# Patient Record
(demographics unavailable — no encounter records)

---

## 2025-02-20 NOTE — HISTORY OF PRESENT ILLNESS
[7] : 7 [4] : 4 [Dull/Aching] : dull/aching [Radiating] : radiating [Sharp] : sharp [Constant] : constant [Ice] : ice [Heat] : heat [Physical therapy] : physical therapy [Standing] : standing [Full time] : Work status: full time [de-identified] : 02/17/2025: BENTLEY GRANDE, a 25 year old female presents today for back. Reports about 1 year history of left sided lower back pain. Reports history of left tibia and ankle fracture, treated with ORIF, spent some time NWB with crutches. Describes her pain as constantly present, with radiating pain to the right buttock and lateral thigh. Reports that pain is aggravated with increased activity and forward flexion. She has attended physical therapy, completed approx. 3 months which she did find to be helpful but temporary. Has been doing home exercises on her own.    [] : no [FreeTextEntry7] : zhanna short [FreeTextEntry9] : stretching, biofreeze, icy hot [de-identified] : over stretching [de-identified] : pcp, acupuncture [de-identified] : xr l-spine done at  [de-identified] : [facility]

## 2025-02-20 NOTE — DISCUSSION/SUMMARY
[de-identified] : 25f with lower back pain and radicular complaints present over the past 1 year. X-ray of the lumbar spine from 5/2024 showing mid lumbar scoliosis. loss of disc space height at L5-S1. Hx of LLE tibia /ankle fx treated with ORIF. On exam, leg length discrepancy 89cm right and 92cm left. Discussed trying a 3/8 inch insert for the right shoe. Has failed conservative treatment including physical therapy. Will obtain MRI of the lumbar spine to eval for HNP will be used to plan future treatment and possible surgical intervention. Patient will follow up after MRI.    Prior to appointment and during encounter with patient extensive medical records were reviewed including but not limited to, hospital records, out patient records, imaging results, and lab data. During this appointment the patient was examined, diagnoses were discussed and explained in a face to face manner. In addition extensive time was spent reviewing aforementioned diagnostic studies. Counseling including abnormal image results, differential diagnoses, treatment options, risk and benefits, lifestyle changes, current condition, and current medications was performed. Patient's comments, questions, and concerns were address and patient verbalized understanding. Based on this patient's presentation at our office, which is an orthopedic spine surgeon's office, this patient inherently / intrinsically has a risk, however minute, of developing issues such as Cauda equina syndrome, bowel and bladder changes, or progression of motor or neurological deficits such as paralysis which may be permanent.   JENNIFER VILLANUEVA Acting as a Scribe for Dr. Zach CASTELLANOS, Jennifer Villanueva, attest that this documentation has been prepared under the direction and in the presence of Provider Carlin Serrano MD.

## 2025-02-20 NOTE — HISTORY OF PRESENT ILLNESS
[7] : 7 [4] : 4 [Dull/Aching] : dull/aching [Radiating] : radiating [Sharp] : sharp [Constant] : constant [Ice] : ice [Heat] : heat [Physical therapy] : physical therapy [Standing] : standing [Full time] : Work status: full time [de-identified] : 02/17/2025: BENTLEY GRANDE, a 25 year old female presents today for back. Reports about 1 year history of left sided lower back pain. Reports history of left tibia and ankle fracture, treated with ORIF, spent some time NWB with crutches. Describes her pain as constantly present, with radiating pain to the right buttock and lateral thigh. Reports that pain is aggravated with increased activity and forward flexion. She has attended physical therapy, completed approx. 3 months which she did find to be helpful but temporary. Has been doing home exercises on her own.    [] : no [FreeTextEntry7] : zhanna short [FreeTextEntry9] : stretching, biofreeze, icy hot [de-identified] : over stretching [de-identified] : pcp, acupuncture [de-identified] : xr l-spine done at  [de-identified] : [facility]

## 2025-02-20 NOTE — PHYSICAL EXAM
[Flexion] : flexion [Normal Coordination] : normal coordination [Normal DTR UE/LE] : normal DTR UE/LE  [Normal Sensation] : normal sensation [Normal Mood and Affect] : normal mood and affect [Oriented] : oriented [Able to Communicate] : able to communicate [Normal Skin] : normal skin [No Rash] : no rash [No Ulcers] : no ulcers [No Lesions] : no lesions [No obvious lymphadenopathy in areas examined] : no obvious lymphadenopathy in areas examined [Well Developed] : well developed [Peripheral vascular exam is grossly normal] : peripheral vascular exam is grossly normal [No Respiratory Distress] : no respiratory distress [] : non-antalgic [FreeTextEntry8] : R>L [de-identified] : left lateral lower LE decreased light touch sensation.

## 2025-02-20 NOTE — HISTORY OF PRESENT ILLNESS
[7] : 7 [4] : 4 [Dull/Aching] : dull/aching [Radiating] : radiating [Sharp] : sharp [Constant] : constant [Ice] : ice [Heat] : heat [Physical therapy] : physical therapy [Standing] : standing [Full time] : Work status: full time [de-identified] : 02/17/2025: BENTLEY GRANDE, a 25 year old female presents today for back. Reports about 1 year history of left sided lower back pain. Reports history of left tibia and ankle fracture, treated with ORIF, spent some time NWB with crutches. Describes her pain as constantly present, with radiating pain to the right buttock and lateral thigh. Reports that pain is aggravated with increased activity and forward flexion. She has attended physical therapy, completed approx. 3 months which she did find to be helpful but temporary. Has been doing home exercises on her own.    [] : no [FreeTextEntry7] : zhanna short [FreeTextEntry9] : stretching, biofreeze, icy hot [de-identified] : over stretching [de-identified] : pcp, acupuncture [de-identified] : xr l-spine done at  [de-identified] : [facility]

## 2025-02-20 NOTE — PHYSICAL EXAM
[Flexion] : flexion [Normal Coordination] : normal coordination [Normal DTR UE/LE] : normal DTR UE/LE  [Normal Sensation] : normal sensation [Normal Mood and Affect] : normal mood and affect [Oriented] : oriented [Able to Communicate] : able to communicate [Normal Skin] : normal skin [No Rash] : no rash [No Ulcers] : no ulcers [No Lesions] : no lesions [No obvious lymphadenopathy in areas examined] : no obvious lymphadenopathy in areas examined [Well Developed] : well developed [Peripheral vascular exam is grossly normal] : peripheral vascular exam is grossly normal [No Respiratory Distress] : no respiratory distress [] : non-antalgic [FreeTextEntry8] : R>L [de-identified] : left lateral lower LE decreased light touch sensation.

## 2025-02-20 NOTE — PHYSICAL EXAM
[Flexion] : flexion [Normal Coordination] : normal coordination [Normal DTR UE/LE] : normal DTR UE/LE  [Normal Sensation] : normal sensation [Normal Mood and Affect] : normal mood and affect [Oriented] : oriented [Able to Communicate] : able to communicate [Normal Skin] : normal skin [No Rash] : no rash [No Ulcers] : no ulcers [No Lesions] : no lesions [No obvious lymphadenopathy in areas examined] : no obvious lymphadenopathy in areas examined [Well Developed] : well developed [Peripheral vascular exam is grossly normal] : peripheral vascular exam is grossly normal [No Respiratory Distress] : no respiratory distress [] : non-antalgic [FreeTextEntry8] : R>L [de-identified] : left lateral lower LE decreased light touch sensation.

## 2025-02-20 NOTE — DISCUSSION/SUMMARY
[de-identified] : 25f with lower back pain and radicular complaints present over the past 1 year. X-ray of the lumbar spine from 5/2024 showing mid lumbar scoliosis. loss of disc space height at L5-S1. Hx of LLE tibia /ankle fx treated with ORIF. On exam, leg length discrepancy 89cm right and 92cm left. Discussed trying a 3/8 inch insert for the right shoe. Has failed conservative treatment including physical therapy. Will obtain MRI of the lumbar spine to eval for HNP will be used to plan future treatment and possible surgical intervention. Patient will follow up after MRI.    Prior to appointment and during encounter with patient extensive medical records were reviewed including but not limited to, hospital records, out patient records, imaging results, and lab data. During this appointment the patient was examined, diagnoses were discussed and explained in a face to face manner. In addition extensive time was spent reviewing aforementioned diagnostic studies. Counseling including abnormal image results, differential diagnoses, treatment options, risk and benefits, lifestyle changes, current condition, and current medications was performed. Patient's comments, questions, and concerns were address and patient verbalized understanding. Based on this patient's presentation at our office, which is an orthopedic spine surgeon's office, this patient inherently / intrinsically has a risk, however minute, of developing issues such as Cauda equina syndrome, bowel and bladder changes, or progression of motor or neurological deficits such as paralysis which may be permanent.   JENNIFER VILLANUEVA Acting as a Scribe for Dr. Zach CASTELLANOS, Jennifer Villanueva, attest that this documentation has been prepared under the direction and in the presence of Provider Carlin Serrano MD.

## 2025-02-20 NOTE — DATA REVIEWED
[Outside X-rays] : outside x-rays [Lumbar Spine] : lumbar spine [Report was reviewed and noted in the chart] : The report was reviewed and noted in the chart [I independently reviewed and interpreted images and report] : I independently reviewed and interpreted images and report [I reviewed the films/CD] : I reviewed the films/CD [FreeTextEntry1] : I stop paperwork reviewed PT progress notes reviewed

## 2025-02-20 NOTE — DISCUSSION/SUMMARY
[de-identified] : 25f with lower back pain and radicular complaints present over the past 1 year. X-ray of the lumbar spine from 5/2024 showing mid lumbar scoliosis. loss of disc space height at L5-S1. Hx of LLE tibia /ankle fx treated with ORIF. On exam, leg length discrepancy 89cm right and 92cm left. Discussed trying a 3/8 inch insert for the right shoe. Has failed conservative treatment including physical therapy. Will obtain MRI of the lumbar spine to eval for HNP will be used to plan future treatment and possible surgical intervention. Patient will follow up after MRI.    Prior to appointment and during encounter with patient extensive medical records were reviewed including but not limited to, hospital records, out patient records, imaging results, and lab data. During this appointment the patient was examined, diagnoses were discussed and explained in a face to face manner. In addition extensive time was spent reviewing aforementioned diagnostic studies. Counseling including abnormal image results, differential diagnoses, treatment options, risk and benefits, lifestyle changes, current condition, and current medications was performed. Patient's comments, questions, and concerns were address and patient verbalized understanding. Based on this patient's presentation at our office, which is an orthopedic spine surgeon's office, this patient inherently / intrinsically has a risk, however minute, of developing issues such as Cauda equina syndrome, bowel and bladder changes, or progression of motor or neurological deficits such as paralysis which may be permanent.   JENNIFER VILLANUEVA Acting as a Scribe for Dr. Zach CASTELLANOS, Jennifer Villanueva, attest that this documentation has been prepared under the direction and in the presence of Provider Carlin Serrano MD.

## 2025-04-02 NOTE — PHYSICAL EXAM
[] : non-antalgic [FreeTextEntry8] : R>L [de-identified] : left lateral lower LE decreased light touch sensation.

## 2025-04-02 NOTE — DATA REVIEWED
[FreeTextEntry1] : On my interpretation of these images & reports Lumbar Spine MRI Ene 2/21/25  Central to right mild disc herniation L5S1  I stop paperwork reviewed

## 2025-04-02 NOTE — DISCUSSION/SUMMARY
[de-identified] : 25f with central to right mild disc herniation L5S1 MRI discussed & reviewed.  I advised the patient that I do not anticipate the findings on MRI to result in surgery.  I am referring the patient to pain management to discuss trying LESI for pain relief.  She will implement shoe insert given leg length discrepancy.  Continue lumbar HEP Discussed medical mgmt and activity modification F/u 6 weeks Cumulative encounter duration exceeded 30 minutes   Prior to appointment and during encounter with patient extensive medical records were reviewed including but not limited to, hospital records, out patient records, imaging results, and lab data. During this appointment the patient was examined, diagnoses were discussed and explained in a face to face manner. In addition extensive time was spent reviewing aforementioned diagnostic studies. Counseling including abnormal image results, differential diagnoses, treatment options, risk and benefits, lifestyle changes, current condition, and current medications was performed. Patient's comments, questions, and concerns were address and patient verbalized understanding. Based on this patient's presentation at our office, which is an orthopedic spine surgeon's office, this patient inherently / intrinsically has a risk, however minute, of developing issues such as Cauda equina syndrome, bowel and bladder changes, or progression of motor or neurological deficits such as paralysis which may be permanent.   LAQUITA LIVINGSTON Acting as a Scribe for Dr. Zach CASTELLANOS, Laquita Livingston, attest that this documentation has been prepared under the direction and in the presence of Provider Carlin Serrano MD.

## 2025-04-02 NOTE — HISTORY OF PRESENT ILLNESS
[de-identified] : 03/31/2025 - Patient presenting to review MRI. C/o LBP, radiating pain to the right buttock and lateral thigh. Has been doing home exercises on her own.   02/17/2025: BENTLEY GRANDE, a 25 year old female presents today for back. Reports about 1 year history of left sided lower back pain. Reports history of left tibia and ankle fracture, treated with ORIF, spent some time NWB with crutches. Describes her pain as constantly present, with radiating pain to the right buttock and lateral thigh. Reports that pain is aggravated with increased activity and forward flexion. She has attended physical therapy, completed approx. 3 months which she did find to be helpful but temporary. Has been doing home exercises on her own.      [] : no [FreeTextEntry6] : Flare ups occur now and then, pain can go up to a 6 or 7 [FreeTextEntry7] : RT glute [FreeTextEntry9] : stretching, biofreeze, icy hot [de-identified] : over stretching [de-identified] : PCP [de-identified] : xr l-spine done at  [de-identified] : [facility]

## 2025-04-02 NOTE — HISTORY OF PRESENT ILLNESS
[de-identified] : 03/31/2025 - Patient presenting to review MRI. C/o LBP, radiating pain to the right buttock and lateral thigh. Has been doing home exercises on her own.   02/17/2025: BENTLEY GRANDE, a 25 year old female presents today for back. Reports about 1 year history of left sided lower back pain. Reports history of left tibia and ankle fracture, treated with ORIF, spent some time NWB with crutches. Describes her pain as constantly present, with radiating pain to the right buttock and lateral thigh. Reports that pain is aggravated with increased activity and forward flexion. She has attended physical therapy, completed approx. 3 months which she did find to be helpful but temporary. Has been doing home exercises on her own.      [] : no [FreeTextEntry6] : Flare ups occur now and then, pain can go up to a 6 or 7 [FreeTextEntry7] : RT glute [FreeTextEntry9] : stretching, biofreeze, icy hot [de-identified] : over stretching [de-identified] : PCP [de-identified] : xr l-spine done at  [de-identified] : [facility] [employer]

## 2025-04-02 NOTE — DISCUSSION/SUMMARY
[de-identified] : 25f with central to right mild disc herniation L5S1 MRI discussed & reviewed.  I advised the patient that I do not anticipate the findings on MRI to result in surgery.  I am referring the patient to pain management to discuss trying LESI for pain relief.  She will implement shoe insert given leg length discrepancy.  Continue lumbar HEP Discussed medical mgmt and activity modification F/u 6 weeks Cumulative encounter duration exceeded 30 minutes   Prior to appointment and during encounter with patient extensive medical records were reviewed including but not limited to, hospital records, out patient records, imaging results, and lab data. During this appointment the patient was examined, diagnoses were discussed and explained in a face to face manner. In addition extensive time was spent reviewing aforementioned diagnostic studies. Counseling including abnormal image results, differential diagnoses, treatment options, risk and benefits, lifestyle changes, current condition, and current medications was performed. Patient's comments, questions, and concerns were address and patient verbalized understanding. Based on this patient's presentation at our office, which is an orthopedic spine surgeon's office, this patient inherently / intrinsically has a risk, however minute, of developing issues such as Cauda equina syndrome, bowel and bladder changes, or progression of motor or neurological deficits such as paralysis which may be permanent.   LAQUITA LIVINGSTON Acting as a Scribe for Dr. Zach CASTELLANOS, Laquita Livingston, attest that this documentation has been prepared under the direction and in the presence of Provider Carlin Serrano MD.

## 2025-04-02 NOTE — PHYSICAL EXAM
[] : non-antalgic [FreeTextEntry8] : R>L [de-identified] : left lateral lower LE decreased light touch sensation.

## 2025-04-18 NOTE — HISTORY OF PRESENT ILLNESS
[Lower back] : lower back [7] : 7 [5] : 5 [Dull/Aching] : dull/aching [Sharp] : sharp [Shooting] : shooting [Tightness] : tightness [Constant] : constant [Leisure] : leisure [Work] : work [Sleep] : sleep [Walking] : walking [Bending forward] : bending forward [Stairs] : stairs [Lying in bed] : lying in bed [FreeTextEntry1] : 25 year F presents for initial evaluation regarding their low back pain.  Pain started about 2 years ago, no inciting event, recently got worse. Saw Dr. Serrano.   Current treatment: Denies   Location: Right lower back into the right buttock, proximal posterior thigh Numbness/tingling: Left calf numbness 2/2 old trauma Weakness: Denies   Bowel/bladder dysfunction: Denies   Prior injections:   Prior Treatments: PT, NSAIDs, Tylenol   Pertinent Surgical History: Denies   Anticoagulation: Denies     Patient denies current infection, fevers, or chills. Physician Disclaimer: I have personally reviewed and confirmed all HPI data with the patient. [] : no [FreeTextEntry7] : b/k buttock mainly the right  [FreeTextEntry9] : stretching, icyhot/ biofreeze  [de-identified] : getting up from a sitting position or from laying down  [de-identified] : arnaud

## 2025-04-18 NOTE — PHYSICAL EXAM
[de-identified] : General: Appears well nourished and well developed, no acute distress Musculoskeletal: Gait is non-antalgic, patient is ambulating without assistance Lumbar Spine Exam:                       Inspection:    erythema (-)   ecchymosis (-)   rashes (-)                         Palpation: Palpation/percussion at the midline lumbar levels reveals no tenderness                      ROM:    ROM - full range of motion with mild stiffness                           Strength Testin/5 in the bilateral lower extremities                      Reflexes: 2/2 in the bilateral lower extremities                      Sensation: Sensation to light touch grossly intact in the bilateral lower extremities                      Special Tests:  SLR: R (-) ; L (-), Facet loading: R (-) ; L (-)

## 2025-04-18 NOTE — ASSESSMENT
[FreeTextEntry1] : 25 year F presents with back and leg pain.  Lumbar radiculitis, attempt TFESI.  A discussion regarding available pain management treatment options occurred with the patient.  These included interventional, rehabilitative, pharmacological, and alternative modalities. We will proceed with the following:   Interventional treatment options: - Proceed with Right L5-S1 with fluoroscopic guidance - If inadequate relief would likely consider ILESI - see additional instructions below      Rehabilitative options: - initiate trial of physical therapy - After pain control - participation in active HEP was discussed and instructions provided   Medication based treatment options: - Denies   Complementary treatment options: - Weight management and lifestyle modifications discussed    Additional treatment recommendations as follows: - patient to follow up with Dr. Serrano - [facility] after [facility]  Patient is presenting with acute/sub-acute pain with impairment in ADLs and functionality. The pain has not responded to at least 6 weeks of conservative care including NSAID therapy, OTC analgesics and/or physical therapy and/or home exercise program within the last 3 months.  The risks, benefits and alternatives of the proposed procedure were explained in detail with the patient. The risks outlined include but are not limited to infection, bleeding, post- dural puncture headache, nerve injury, a temporary increase in pain, failure to resolve symptoms, need for future interventions, allergic reaction, and possible elevation of blood sugar in diabetics if using corticosteroid.  All questions were answered to patient's apparent satisfaction, and he/she verbalized an understanding.

## 2025-04-18 NOTE — DATA REVIEWED
[FreeTextEntry1] :  MRI images personally reviewed and reviewed with patient. L/S MRI ZP 02/2025: L5-S1: Central annular fissure with disc protrusion mildly narrowing the thecal sac

## 2025-05-13 NOTE — PHYSICAL EXAM
[Normal Coordination] : normal coordination [Normal DTR UE/LE] : normal DTR UE/LE  [Normal Sensation] : normal sensation [Normal Mood and Affect] : normal mood and affect [Oriented] : oriented [Able to Communicate] : able to communicate [Normal Skin] : normal skin [No Rash] : no rash [No Ulcers] : no ulcers [No Lesions] : no lesions [No obvious lymphadenopathy in areas examined] : no obvious lymphadenopathy in areas examined [Well Developed] : well developed [Peripheral vascular exam is grossly normal] : peripheral vascular exam is grossly normal [No Respiratory Distress] : no respiratory distress [Lungs clear to auscultation bilaterally] : lungs clear to auscultation bilaterally [Normal Bowel Sounds] : normal bowel sounds [Non-Tender] : non-tender [No HSM] : no HSM [No Mass] : no mass [Flexion] : flexion [] : non-antalgic [FreeTextEntry8] : R>L

## 2025-05-13 NOTE — DATA REVIEWED
[FreeTextEntry1] : On my interpretation of these images & reports Lumbar Spine MRI Ene 2/21/25  Central to right mild disc herniation L5S1  I stop paperwork reviewed Pain mgmt progress notes reviewed

## 2025-05-13 NOTE — HISTORY OF PRESENT ILLNESS
[6] : 6 [5] : 5 [Full time] : Work status: full time [de-identified] : 05/12/2025 - Patient returns for follow up visit. C/o low back pain with intermittent radiation into the RT buttock. Pain exacerbated with extended standing and walking. Takes ibuprofen once every other week. Feels back pain trending positively. In the process of scheduling LESI with Dr. Crawford. She is currently performing home stretching exercises and plans to start PT in the future.   03/31/2025 - Patient presenting to review MRI. C/o LBP, radiating pain to the right buttock and lateral thigh. Has been doing home exercises on her own.   02/17/2025: BENTLEY GRANDE, a 25 year old female presents today for back. Reports about 1 year history of left sided lower back pain. Reports history of left tibia and ankle fracture, treated with ORIF, spent some time NWB with crutches. Describes her pain as constantly present, with radiating pain to the right buttock and lateral thigh. Reports that pain is aggravated with increased activity and forward flexion. She has attended physical therapy, completed approx. 3 months which she did find to be helpful but temporary. Has been doing home exercises on her own.      [de-identified] : pain mgmt

## 2025-05-13 NOTE — DISCUSSION/SUMMARY
[de-identified] : 25f with central to right mild disc herniation L5S1 Patient will schedule indicated LESI with Dr. Crawford. She will continue with exercise-based rehabilitation, referred for formal lumbar physical therapy to work on stretching strengthening and ROM. Continue  ibuprofen as prescribed for pain relief. F/u 6 wks  Prior to appointment and during encounter with patient extensive medical records were reviewed including but not limited to, hospital records, out patient records, imaging results, and lab data. During this appointment the patient was examined, diagnoses were discussed and explained in a face to face manner. In addition extensive time was spent reviewing aforementioned diagnostic studies. Counseling including abnormal image results, differential diagnoses, treatment options, risk and benefits, lifestyle changes, current condition, and current medications was performed. Patient's comments, questions, and concerns were address and patient verbalized understanding. Based on this patient's presentation at our office, which is an orthopedic spine surgeon's office, this patient inherently / intrinsically has a risk, however minute, of developing issues such as Cauda equina syndrome, bowel and bladder changes, or progression of motor or neurological deficits such as paralysis which may be permanent.   LAQUITA MILES Acting as a Scribe for Laquita Humphreys, attest that this documentation has been prepared under the direction and in the presence of Provider Carlin Serrano MD.

## 2025-05-29 NOTE — PROCEDURE
[FreeTextEntry3] : Date of Service: 05/29/2025     Account: [id number]    Patient: BENTLEY GRANDE     YOB: 1999    Age: 25 year     Surgeon:      Cezar Crawford DO    Assistant:    None    Pre-Operative Diagnosis:         Lumbar Radiculopathy    Post Operative Diagnosis:        Lumbar Radiculopathy    Procedure:             Right L5-S1 transforaminal epidural steroid injection under fluoroscopic guidance.    Anesthesia:          None  Procedure Detail: Patient was seen in the preoperative area and a detailed discussion about the risks (including potential neurovascular injury leading to paralysis and the differences between particulate and non-particulate steroid medications), benefits and alternatives was carried out.  All related questions were satisfactorily answered and an informed consent was signed.  Procedure site was marked and patient was taken to the fluoroscopy suite and placed in the prone position on the fluoroscopic table.  A spine positioning device was utilized for adequate position.  Monitors were applied and vital signs were recorded.  Anesthesia was carried out as mentioned above. A timeout was performed with all essential staff present and the site and side were verified.   The respective interspace was located using fluoroscopic guidance.  The fluoroscopy beam was adjusted until the respective endplates were sharply aligned.  Oblique fluoroscopy projection was utilized so that the L5-S1 foramen was visualized.  The target was the area was just inferior to the pedicle of the respective level mentioned above (safe triangle).  The area was prepped with Chloraprep in a circumferential manner and draped in sterile aseptic fashion.  The skin overlying the target point was infiltrated with 3-4 ml of 1% lidocaine using a 25 gauge needle.   A 22-gauge spinal needle was placed inferior to the right L5 pedicle till it contacted os. The needle was slowly walked off under lateral fluoroscopy to position the needle tip in the posterior portion of the foramen.  There was no cerebrospinal fluid or blood on aspiration.  Then, 0.5-1 ml of Omnipaque 240 mg/ml contrast agent was injected with visualization of nerve root sleeve on AP fluoroscopic view, and retrograde epidural flow.  At this point, a solution containing 1 ml of 0.9% preservative free normal saline with 10mg of Dexamethasone 1mg/ml was injected onto the nerve root sleeve and into the epidural space. Needles were flushed and removed.  Patient tolerated the procedure well.   Vital signs remained normal throughout the procedure.  The patient tolerated the procedure well.  There were no immediate complications from the performed procedure.  The patient was instructed to apply ice over the injection sites for twenty minutes every two hours for the next 24 hours.    Disposition:       1. The patient was advised to F/U in 1-2 weeks to assess the response to the injection.       2. The patient was also instructed to contact me immediately if there were any concerns related to the procedure performed.

## 2025-06-29 NOTE — DISCUSSION/SUMMARY
[de-identified] : 25f with central to right mild disc herniation L5S1 Patient will follow up with Dr. Crawford as necessary, recently received RT L5S1 TFESI on 5/29/25. Patient reports some relief following the injection. Patient was provided with a referral for lumbar physical therapy to work on stretching, strengthening and range of motion. Continue to implement biofreeze and Tylenol as necessary. F/u 6 wks  Prior to appointment and during encounter with patient extensive medical records were reviewed including but not limited to, hospital records, out patient records, imaging results, and lab data. During this appointment the patient was examined, diagnoses were discussed and explained in a face to face manner. In addition extensive time was spent reviewing aforementioned diagnostic studies. Counseling including abnormal image results, differential diagnoses, treatment options, risk and benefits, lifestyle changes, current condition, and current medications was performed. Patient's comments, questions, and concerns were address and patient verbalized understanding. Based on this patient's presentation at our office, which is an orthopedic spine surgeon's office, this patient inherently / intrinsically has a risk, however minute, of developing issues such as Cauda equina syndrome, bowel and bladder changes, or progression of motor or neurological deficits such as paralysis which may be permanent.   LAQUITA LIVINGSTON Acting as a Scribe for Dr. Zach CASTELLANOS, Laquita Livingston, attest that this documentation has been prepared under the direction and in the presence of Provider Carlin Serrano MD.

## 2025-06-29 NOTE — HISTORY OF PRESENT ILLNESS
[4] : 4 [5] : 5 [Full time] : Work status: full time [de-identified] : 06/23/2025 - Patient returns to office for follow up. Received RT L5S1 TFESI with Dr. Crawford on 5/29/25. States injection was helpful - but states relief was short term. Complains of primarily back pain. No leg pain is reported. Using topical biofreeze, occasionally Tylenol. Active with stretching exercises, wishes to start physical therapy. Improved compared to last month.   05/12/2025 - Patient returns for follow up visit. C/o low back pain with intermittent radiation into the RT buttock. Pain exacerbated with extended standing and walking. Takes ibuprofen once every other week. Feels back pain trending positively. In the process of scheduling LESI with Dr. Crawford. She is currently performing home stretching exercises and plans to start PT in the future.   03/31/2025 - Patient presenting to review MRI. C/o LBP, radiating pain to the right buttock and lateral thigh. Has been doing home exercises on her own.   02/17/2025: BENTLEY GRANDE, a 25 year old female presents today for back. Reports about 1 year history of left sided lower back pain. Reports history of left tibia and ankle fracture, treated with ORIF, spent some time NWB with crutches. Describes her pain as constantly present, with radiating pain to the right buttock and lateral thigh. Reports that pain is aggravated with increased activity and forward flexion. She has attended physical therapy, completed approx. 3 months which she did find to be helpful but temporary. Has been doing home exercises on her own.       [de-identified] : no

## 2025-06-29 NOTE — PHYSICAL EXAM
[Normal Coordination] : normal coordination [Normal DTR UE/LE] : normal DTR UE/LE  [Normal Sensation] : normal sensation [Normal Mood and Affect] : normal mood and affect [Oriented] : oriented [Able to Communicate] : able to communicate [Normal Skin] : normal skin [No Rash] : no rash [No Ulcers] : no ulcers [No Lesions] : no lesions [No obvious lymphadenopathy in areas examined] : no obvious lymphadenopathy in areas examined [Well Developed] : well developed [Peripheral vascular exam is grossly normal] : peripheral vascular exam is grossly normal [No Respiratory Distress] : no respiratory distress [Flexion] : flexion [] : non-antalgic [FreeTextEntry8] : R>L

## 2025-07-18 NOTE — HISTORY OF PRESENT ILLNESS
[Lower back] : lower back [5] : 5 [Dull/Aching] : dull/aching [Sharp] : sharp [Shooting] : shooting [Tightness] : tightness [Constant] : constant [Leisure] : leisure [Work] : work [Sleep] : sleep [Walking] : walking [Bending forward] : bending forward [Stairs] : stairs [Lying in bed] : lying in bed [7] : 7 [1] : 2 [Intermittent] : intermittent [FreeTextEntry1] : 25 year F presents for follow up evaluation regarding their low back pain.  Last seen 05/29 for TFESI, with 85% relief.    Current treatment: PT   Location: Right lower back into the right buttock, proximal posterior thigh Numbness/tingling: Left calf numbness 2/2 old trauma Weakness: Denies   Bowel/bladder dysfunction: Denies   Prior injections: R L5-S1 TFESI 05/29/25 85%  Prior Treatments: PT, NSAIDs, Tylenol   Pertinent Surgical History: Denies   Anticoagulation: Denies     Patient denies current infection, fevers, or chills. Physician Disclaimer: I have personally reviewed and confirmed all HPI data with the patient. [] : no [FreeTextEntry7] :  buttock mainly the right  [FreeTextEntry9] : stretching, icyhot/ biofreeze  [de-identified] : getting up from a sitting position or from laying down  [de-identified] : arnaud

## 2025-07-18 NOTE — HISTORY OF PRESENT ILLNESS
[Lower back] : lower back [5] : 5 [Dull/Aching] : dull/aching [Sharp] : sharp [Shooting] : shooting [Tightness] : tightness [Constant] : constant [Leisure] : leisure [Work] : work [Sleep] : sleep [Walking] : walking [Bending forward] : bending forward [Stairs] : stairs [Lying in bed] : lying in bed [7] : 7 [1] : 2 [Intermittent] : intermittent [FreeTextEntry1] : 25 year F presents for follow up evaluation regarding their low back pain.  Last seen 05/29 for TFESI, with 85% relief.    Current treatment: PT   Location: Right lower back into the right buttock, proximal posterior thigh Numbness/tingling: Left calf numbness 2/2 old trauma Weakness: Denies   Bowel/bladder dysfunction: Denies   Prior injections: R L5-S1 TFESI 05/29/25 85%  Prior Treatments: PT, NSAIDs, Tylenol   Pertinent Surgical History: Denies   Anticoagulation: Denies     Patient denies current infection, fevers, or chills. Physician Disclaimer: I have personally reviewed and confirmed all HPI data with the patient. [] : no [FreeTextEntry7] :  buttock mainly the right  [FreeTextEntry9] : stretching, icyhot/ biofreeze  [de-identified] : getting up from a sitting position or from laying down  [de-identified] : arnaud

## 2025-07-18 NOTE — PHYSICAL EXAM
[de-identified] : General: Appears well nourished and well developed, no acute distress Musculoskeletal: Gait is non-antalgic, patient is ambulating without assistance Lumbar Spine Exam:                       Inspection:    erythema (-)   ecchymosis (-)   rashes (-)                         Palpation: Palpation/percussion at the midline lumbar levels reveals no tenderness                      ROM:    ROM - full range of motion with mild stiffness                           Strength Testin/5 in the bilateral lower extremities                      Reflexes: 2/2 in the bilateral lower extremities                      Sensation: Sensation to light touch grossly intact in the bilateral lower extremities                      Special Tests:  SLR: R (-) ; L (-), Facet loading: R (-) ; L (-)

## 2025-07-18 NOTE — ASSESSMENT
[FreeTextEntry1] : 25 year F presents with back and leg pain.  Lumbar radiculitis, repeat TFESI as needed. Continue conservative therapy.  A discussion regarding available pain management treatment options occurred with the patient.  These included interventional, rehabilitative, pharmacological, and alternative modalities. We will proceed with the following:   Interventional treatment options: - None at this time - If inadequate relief would likely consider ILESI - see additional instructions below      Rehabilitative options: - Continue physical therapy - participation in active HEP was discussed and instructions provided   Medication based treatment options: - Denies   Complementary treatment options: - Weight management and lifestyle modifications discussed    Additional treatment recommendations as follows: - patient to follow up with Dr. Serrano - RTC in 10 weeks